# Patient Record
Sex: MALE | Race: WHITE | NOT HISPANIC OR LATINO | Employment: FULL TIME | URBAN - NONMETROPOLITAN AREA
[De-identification: names, ages, dates, MRNs, and addresses within clinical notes are randomized per-mention and may not be internally consistent; named-entity substitution may affect disease eponyms.]

---

## 2024-05-04 ENCOUNTER — OFFICE VISIT (OUTPATIENT)
Dept: URGENT CARE | Facility: CLINIC | Age: 54
End: 2024-05-04
Payer: COMMERCIAL

## 2024-05-04 VITALS
HEIGHT: 76 IN | BODY MASS INDEX: 37.26 KG/M2 | SYSTOLIC BLOOD PRESSURE: 128 MMHG | TEMPERATURE: 97 F | WEIGHT: 306 LBS | DIASTOLIC BLOOD PRESSURE: 80 MMHG | OXYGEN SATURATION: 99 % | HEART RATE: 90 BPM | RESPIRATION RATE: 16 BRPM

## 2024-05-04 DIAGNOSIS — Z23 ENCOUNTER FOR IMMUNIZATION: ICD-10-CM

## 2024-05-04 DIAGNOSIS — L03.811 CELLULITIS OF HEAD EXCEPT FACE: Primary | ICD-10-CM

## 2024-05-04 DIAGNOSIS — L55.9 SUNBURN: ICD-10-CM

## 2024-05-04 DIAGNOSIS — S80.812A ABRASION OF ANTERIOR LEFT LOWER LEG, INITIAL ENCOUNTER: ICD-10-CM

## 2024-05-04 PROCEDURE — 99203 OFFICE O/P NEW LOW 30 MIN: CPT

## 2024-05-04 PROCEDURE — 90471 IMMUNIZATION ADMIN: CPT

## 2024-05-04 PROCEDURE — 90715 TDAP VACCINE 7 YRS/> IM: CPT

## 2024-05-04 RX ORDER — TIRZEPATIDE 7.5 MG/.5ML
7.5 INJECTION, SOLUTION SUBCUTANEOUS
COMMUNITY
Start: 2024-04-06

## 2024-05-04 RX ORDER — METOPROLOL SUCCINATE 50 MG/1
75 TABLET, EXTENDED RELEASE ORAL DAILY
COMMUNITY
Start: 2024-04-11 | End: 2024-07-10

## 2024-05-04 RX ORDER — CEPHALEXIN 500 MG/1
500 CAPSULE ORAL EVERY 12 HOURS SCHEDULED
Qty: 14 CAPSULE | Refills: 0 | Status: SHIPPED | OUTPATIENT
Start: 2024-05-04 | End: 2024-05-11

## 2024-05-04 RX ORDER — ATORVASTATIN CALCIUM 20 MG/1
20 TABLET, FILM COATED ORAL DAILY
COMMUNITY
Start: 2024-03-11

## 2024-05-04 RX ORDER — HYDROCHLOROTHIAZIDE 25 MG/1
1 TABLET ORAL DAILY
COMMUNITY
Start: 2024-04-30

## 2024-05-04 RX ORDER — AMLODIPINE BESYLATE 5 MG/1
5 TABLET ORAL DAILY
COMMUNITY
Start: 2024-03-07 | End: 2025-03-07

## 2024-05-04 RX ORDER — ALPRAZOLAM 0.5 MG/1
0.5 TABLET ORAL 3 TIMES DAILY PRN
COMMUNITY
Start: 2024-02-26

## 2024-05-04 RX ORDER — PROCHLORPERAZINE 25 MG/1
1 SUPPOSITORY RECTAL
COMMUNITY
Start: 2024-04-18

## 2024-05-04 RX ORDER — LOSARTAN POTASSIUM 100 MG/1
100 TABLET ORAL DAILY
COMMUNITY
Start: 2023-11-20

## 2024-05-04 RX ORDER — ASPIRIN 81 MG/1
81 TABLET, CHEWABLE ORAL DAILY
COMMUNITY

## 2024-05-04 RX ORDER — INSULIN ASPART 100 [IU]/ML
2.7 INJECTION, SOLUTION INTRAVENOUS; SUBCUTANEOUS
COMMUNITY
Start: 2024-03-06

## 2024-05-04 NOTE — PROGRESS NOTES
[facility]        NAME: Shane Gonzalez is a 54 y.o. male  : 1970    MRN: [id number]  DATE: May 4, 2024  TIME: 2:56 PM    Assessment and Plan   Cellulitis of head except face [L03.811]  1. Cellulitis of head except face  cephalexin (KEFLEX) 500 mg capsule      2. Sunburn        3. Encounter for immunization  Tdap Vaccine greater than or equal to 6yo      4. Abrasion of anterior left lower leg, initial encounter          Left ear is red and tender with blister. Will treat with cephalexin for underlying cellulitis. Tdap updated for abrasion to left shin.     Patient Instructions       Follow up with PCP in 3-5 days.  Proceed to  ER if symptoms worsen.    Chief Complaint     Chief Complaint   Patient presents with    Earache     C/o left sided external ear pain, small abrasion noted surrounded by redness. Onset yesterday.          History of Present Illness       Patient is a 54 year old male who presents to the office today for left ear pain and swelling with redness. Notes that he was out at a track meet the other week and now his left ear is red, swollen, and tender. Denies fever or chills. Has been putting aloe on it.     Earache         Review of Systems   Review of Systems   HENT:  Positive for ear pain.    All other systems reviewed and are negative.        Current Medications       Current Outpatient Medications:     ALPRAZolam (XANAX) 0.5 mg tablet, Take 0.5 mg by mouth Three times daily as needed, Disp: , Rfl:     amLODIPine (NORVASC) 5 mg tablet, Take 5 mg by mouth daily, Disp: , Rfl:     aspirin 81 mg chewable tablet, Chew 81 mg daily, Disp: , Rfl:     atorvastatin (LIPITOR) 20 mg tablet, Take 20 mg by mouth daily, Disp: , Rfl:     cephalexin (KEFLEX) 500 mg capsule, Take 1 capsule (500 mg total) by mouth every 12 (twelve) hours for 7 days, Disp: 14 capsule, Rfl: 0    Continuous Glucose Sensor (Dexcom G6 Sensor) MISC, Inject 1 Application under the skin every 10 days, Disp: , Rfl:      "hydroCHLOROthiazide 25 mg tablet, Take 1 tablet by mouth daily, Disp: , Rfl:     Insulin Aspart (NovoLOG) 100 units/mL injection, Inject 2.7 Units under the skin every 1 (one) hour, Disp: , Rfl:     losartan (COZAAR) 100 MG tablet, Take 100 mg by mouth daily, Disp: , Rfl:     metoprolol succinate (TOPROL-XL) 50 mg 24 hr tablet, Take 75 mg by mouth daily, Disp: , Rfl:     Mounjaro 7.5 MG/0.5ML, Inject 7.5 mg under the skin every 7 days, Disp: , Rfl:     nystatin-triamcinolone (MYCOLOG-II) cream, Apply 1 application. topically 4 (four) times a day as needed, Disp: , Rfl:     Current Allergies     Allergies as of 05/04/2024    (No Known Allergies)            The following portions of the patient's history were reviewed and updated as appropriate: allergies, current medications, past family history, past medical history, past social history, past surgical history and problem list.     Past Medical History:   Diagnosis Date    Anxiety     Diabetes mellitus (HCC)     Hypertension        History reviewed. No pertinent surgical history.    History reviewed. No pertinent family history.      Medications have been verified.        Objective   /80   Pulse 90   Temp (!) 97 °F (36.1 °C)   Resp 16   Ht 6' 4\" (1.93 m)   Wt (!) 139 kg (306 lb)   SpO2 99%   BMI 37.25 kg/m²        Physical Exam     Physical Exam  Vitals and nursing note reviewed.   Constitutional:       Appearance: Normal appearance. He is normal weight.   HENT:      Ears:        Comments: Outer ear is red and swollen. Ttp.   Cardiovascular:      Rate and Rhythm: Normal rate.      Pulses: Normal pulses.   Pulmonary:      Effort: Pulmonary effort is normal.   Skin:     General: Skin is warm.      Capillary Refill: Capillary refill takes less than 2 seconds.   Neurological:      Mental Status: He is alert.                   "

## 2024-06-20 ENCOUNTER — OFFICE VISIT (OUTPATIENT)
Dept: URGENT CARE | Facility: CLINIC | Age: 54
End: 2024-06-20
Payer: COMMERCIAL

## 2024-06-20 VITALS
WEIGHT: 301 LBS | HEART RATE: 108 BPM | TEMPERATURE: 97.5 F | RESPIRATION RATE: 17 BRPM | OXYGEN SATURATION: 98 % | BODY MASS INDEX: 36.65 KG/M2 | HEIGHT: 76 IN | DIASTOLIC BLOOD PRESSURE: 62 MMHG | SYSTOLIC BLOOD PRESSURE: 106 MMHG

## 2024-06-20 DIAGNOSIS — R73.9 HYPERGLYCEMIA: ICD-10-CM

## 2024-06-20 DIAGNOSIS — L98.9 SKIN LESION OF BACK: Primary | ICD-10-CM

## 2024-06-20 PROCEDURE — S9083 URGENT CARE CENTER GLOBAL: HCPCS

## 2024-06-20 PROCEDURE — G0382 LEV 3 HOSP TYPE B ED VISIT: HCPCS

## 2024-06-20 RX ORDER — LOSARTAN POTASSIUM AND HYDROCHLOROTHIAZIDE 25; 100 MG/1; MG/1
TABLET ORAL
COMMUNITY

## 2024-06-20 RX ORDER — TADALAFIL 5 MG/1
5 TABLET ORAL DAILY
COMMUNITY
Start: 2024-06-04

## 2024-06-20 RX ORDER — SULFAMETHOXAZOLE AND TRIMETHOPRIM 800; 160 MG/1; MG/1
1 TABLET ORAL EVERY 12 HOURS SCHEDULED
Qty: 14 TABLET | Refills: 0 | Status: SHIPPED | OUTPATIENT
Start: 2024-06-20 | End: 2024-06-27

## 2024-06-20 NOTE — PROGRESS NOTES
[facility]        NAME: Shane Gonzalez is a 54 y.o. male  : 1970    MRN: [id number]  DATE: 2024  TIME: 7:13 PM    Assessment and Plan   Skin lesion of back [L98.9]  1. Skin lesion of back  sulfamethoxazole-trimethoprim (BACTRIM DS) 800-160 mg per tablet      2. Hyperglycemia          Single pustular skin lesion revealing purulent drainage with mild pressure applied. Given history of DM, will treat with Bactrim and encouraged continued supportive measures.  Patient did check blood glucose in examination room and found to be 584. Provided water as decreased PO intake and Dexcom malfunction. Advised to contact Endocrinology if malfunction remains and monitor blood glucose recordings temporarily via fingerstick. Follow up with PCP in 3-5 days or proceed to emergency department for worsening symptoms.  Red flag symptoms reviewed and low threshold to proceed to ED. Patient verbalized understanding of instructions given.        Patient Instructions     Patient Instructions   Take antibiotic as prescribed  Monitor for signs of worsening infection  Monitor blood glucose recordings  Eat and drink well  Follow up with PCP in 3-5 days.  Proceed to  ER if symptoms worsen.    If tests have been performed at [facility], our office will contact you with results if changes need to be made to the care plan discussed with you at the visit.  You can review your full results on [facility].    Diabetic Hyperglycemia   AMBULATORY CARE:   Diabetic hyperglycemia  is a blood glucose (sugar) level that is higher than your diabetes care team provider recommends. You may not have any signs and symptoms. You may have more thirst and urinate more often than usual.  Call your local emergency number (911 in the [place]) for any of the following:   You have a seizure.    You begin to breathe fast or are short of breath.    You become weak and confused.    Seek care immediately if:   Your blood sugar level is over 240  mg/dL and  you have ketones in your urine.    Your breath smells fruity.    You have nausea and are vomiting.    You have symptoms of dehydration, such as dark yellow urine, dry mouth and lips, and dry skin.    Call your diabetes care team provider if:   You continue to have higher blood sugar levels than your provider recommends.    You have questions or concerns about your condition or care.    Why it is important to manage diabetic hyperglycemia:  Over time, hyperglycemia can damage your nerves, blood vessels, tissues, and organs. Damage to arteries may increase your risk for heart attack and stroke. Nerve damage may also lead to other heart, stomach, and nerve problems. If diabetic hyperglycemia is not controlled, it can lead to diabetic ketoacidosis (DKA) or hyperglycemic hyperosmolar state (HHS). These are serious conditions that can become life-threatening.  Medicines:   Medicines , such as insulin and diabetes pills, decrease blood sugar levels.    Take your medicine as directed.  Contact your healthcare provider if you think your medicine is not helping or if you have side effects. Tell your provider if you are allergic to any medicine. Keep a list of the medicines, vitamins, and herbs you take. Include the amounts, and when and why you take them. Bring the list or the pill bottles to follow-up visits. Carry your medicine list with you in case of an emergency.    Manage diabetic hyperglycemia:   If you take diabetes medicine or insulin, take it as directed.  Missed or wrong doses can cause your blood sugar level to go up.    Tell your diabetes care team provider if you continue to have trouble managing your blood sugar level.  He or she may change the type, amount, or timing of your diabetes medicine or insulin. If you do not take diabetes medicine or insulin, you may need to start.    Work with your provider to develop a sick day plan.  Illness can cause your blood sugar to rise. A sick day plan helps you  control your blood sugar level when you are sick.    Prevent diabetic hyperglycemia:   Check your blood sugar levels regularly.  Ask your diabetes care team provider how often to check your blood sugar and what your levels should be.            Follow your meal plan.  Your blood sugar can go up if you eat a large meal or you eat more carbohydrates than recommended. Work with a dietitian to develop a meal plan that is right for you.    Exercise as directed.  Physical activity, such as exercise, can help lower your blood sugar when it is high. It can also keep your blood sugar levels steady over time. Be active for at least 30 minutes, 5 days a week. Include muscle strengthening activities 2 days each week. Do not sit for longer than 30 minutes at a time. Work with your provider to create an activity plan. Children should get at least 60 minutes of physical activity each day.         Check your ketones before exercise  if your blood sugar level is above 240 mg/dL. Do not exercise if you have ketones in your urine  because your blood sugar level may rise even more. Ask your healthcare provider how to lower your blood sugar when you have ketones.    Follow up with your diabetes care team provider as directed:  Your provider may refer you to a dietitian. Write down your questions so you remember to ask them during your visits.  © Copyright [employer] 2023 Information is for End User's use only and may not be sold, redistributed or otherwise used for commercial purposes.  The above information is an  only. It is not intended as medical advice for individual conditions or treatments. Talk to your doctor, nurse or pharmacist before following any medical regimen to see if it is safe and effective for you.  Abscess   AMBULATORY CARE:   An abscess  is an area under the skin where pus (infected fluid) collects. An abscess is often caused by bacteria, fungi or other germs that get into an open wound. You can get an  abscess anywhere on your body.       Common signs and symptoms of an abscess:  You may have a swollen mass that is red and painful. Pus may leak out of the mass. The pus will be white or yellow and may smell bad. You may have redness and pain days before the mass appears. You may have a fever and chills if the infection spreads.  Seek immediate care if:   The area around your abscess becomes very painful, warm, or has red streaks.    You have a fever and chills.    Your heart is beating faster than usual.    You feel faint or confused.    Call your doctor if:   Your abscess gets bigger or does not get better.    Your abscess returns.    You have questions or concerns about your condition or care.    Treatment for an abscess:  Your healthcare provider may need to make a cut in the abscess to allow the pus to drain. You may need surgery to remove your abscess. You may  need any of the following:  Antibiotics  help treat a bacterial infection.    Acetaminophen  decreases pain and fever. It is available without a doctor's order. Ask how much to take and how often to take it. Follow directions. Read the labels of all other medicines you are using to see if they also contain acetaminophen, or ask your doctor or pharmacist. Acetaminophen can cause liver damage if not taken correctly.    NSAIDs , such as ibuprofen, help decrease swelling, pain, and fever. This medicine is available with or without a doctor's order. NSAIDs can cause stomach bleeding or kidney problems in certain people. If you take blood thinner medicine, always ask your healthcare provider if NSAIDs are safe for you. Always read the medicine label and follow directions.    Take your medicine as directed.  Contact your healthcare provider if you think your medicine is not helping or if you have side effects. Tell your provider if you are allergic to any medicine. Keep a list of the medicines, vitamins, and herbs you take. Include the amounts, and when and  why you take them. Bring the list or the pill bottles to follow-up visits. Carry your medicine list with you in case of an emergency.    Self-care:   Apply a warm compress to your abscess.  This will help it open and drain. Wet a washcloth in warm, but not hot, water. Apply the compress for 10 minutes. Repeat this 4 times each day. Do not  press on an abscess or try to open it with a needle. You may push the bacteria deeper or into your blood.    Do not share your clothes, towels, or sheets with anyone.  This can spread the infection to others.    Wash your hands often.  This can help prevent the spread of germs. Use soap and water or an alcohol-based hand rub.       Care for your wound after it is drained:   Care for your wound as directed.  If your healthcare provider says it is okay, carefully remove the bandage and gauze packing. You may need to soak the gauze to get it out of your wound. Clean your wound and the area around it as directed. Dry the area and put on new, clean bandages. Change your bandages when they get wet or dirty.    Ask your healthcare provider how to change the gauze in your wound.  Keep track of how many pieces of gauze are placed inside the wound. Do not put too much packing in the wound. Do not pack the gauze too tightly in your wound.    Follow up with your healthcare provider in 1 to 3 days:  You may need to have your packing removed or your bandage changed. Write down your questions so you remember to ask them during your visits.  © Copyright [employer] 2023 Information is for End User's use only and may not be sold, redistributed or otherwise used for commercial purposes.  The above information is an  only. It is not intended as medical advice for individual conditions or treatments. Talk to your doctor, nurse or pharmacist before following any medical regimen to see if it is safe and effective for you.        Chief Complaint     Chief Complaint   Patient presents with     "sore     C/o sore on lower left side of back, pt is concerned because he is diabetic and his sugars have been high. Pt is also concerned about sore on left ankle.          History of Present Illness       54-year-old male with a past medical history significant for hypertension and type I DM on insulin therapy presents with multiple complaints.  Patient reports noticing skin lesion to right side of lower back today.  Denies known injury or trauma.  Denies drainage.  Denies fever, chills, or flulike symptoms.  Also reports sunburn to bilateral feet without drainage. Does see Podiatry.     Patient also reports hyperglycemia today.  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  He reports because of this schedule he was unable to eat or drink today and only had 1 bag of pretzels.  Reports Dexcom malfunction so when able to finally perform fingerstick for blood sugar he noticed it was \"high\" and over 600.  He denies feelings of hyperglycemia and normally blood glucose well-controlled with average reading of 170. Patient unsure if elevated due to skin lesion/possible cellulitis or events of the day.         Review of Systems   Review of Systems   Constitutional:  Negative for chills and fever.   HENT:  Negative for congestion, ear discharge, ear pain, rhinorrhea and sore throat.    Eyes:  Negative for discharge.   Respiratory:  Negative for cough, shortness of breath and wheezing.    Cardiovascular:  Negative for chest pain.   Gastrointestinal:  Negative for abdominal pain, diarrhea, nausea and vomiting.   Musculoskeletal:  Negative for arthralgias and myalgias.   Skin:  Positive for color change and wound.   Neurological:  Negative for weakness and numbness.         Current Medications       Current Outpatient Medications:     ALPRAZolam (XANAX) 0.5 mg tablet, Take 0.5 mg by mouth Three times daily as needed, Disp: , Rfl:     amLODIPine (NORVASC) 5 mg tablet, Take 5 mg by mouth daily, Disp: , " "Rfl:     aspirin 81 mg chewable tablet, Chew 81 mg daily, Disp: , Rfl:     atorvastatin (LIPITOR) 20 mg tablet, Take 20 mg by mouth daily, Disp: , Rfl:     Continuous Glucose Sensor (Dexcom G6 Sensor) MISC, Inject 1 Application under the skin every 10 days, Disp: , Rfl:     hydroCHLOROthiazide 25 mg tablet, Take 1 tablet by mouth daily, Disp: , Rfl:     Insulin Aspart (NovoLOG) 100 units/mL injection, Inject 2.7 Units under the skin every 1 (one) hour, Disp: , Rfl:     losartan (COZAAR) 100 MG tablet, Take 100 mg by mouth daily, Disp: , Rfl:     losartan-hydrochlorothiazide (Hyzaar) 100-25 MG per tablet, Take by mouth, Disp: , Rfl:     metoprolol succinate (TOPROL-XL) 50 mg 24 hr tablet, Take 75 mg by mouth daily, Disp: , Rfl:     Mounjaro 7.5 MG/0.5ML, Inject 7.5 mg under the skin every 7 days, Disp: , Rfl:     nystatin-triamcinolone (MYCOLOG-II) cream, Apply 1 application. topically 4 (four) times a day as needed, Disp: , Rfl:     sulfamethoxazole-trimethoprim (BACTRIM DS) 800-160 mg per tablet, Take 1 tablet by mouth every 12 (twelve) hours for 7 days, Disp: 14 tablet, Rfl: 0    tadalafil (CIALIS) 5 MG tablet, Take 5 mg by mouth daily, Disp: , Rfl:     Current Allergies     Allergies as of 06/20/2024    (No Known Allergies)            The following portions of the patient's history were reviewed and updated as appropriate: allergies, current medications, past family history, past medical history, past social history, past surgical history and problem list.     Past Medical History:   Diagnosis Date    Anxiety     Diabetes mellitus (HCC)     Hypertension        History reviewed. No pertinent surgical history.    History reviewed. No pertinent family history.      Medications have been verified.        Objective   /62   Pulse (!) 108   Temp 97.5 °F (36.4 °C)   Resp 17   Ht 6' 4\" (1.93 m)   Wt (!) 137 kg (301 lb)   SpO2 98%   BMI 36.64 kg/m²   No LMP for male patient.       Physical Exam     Physical " Exam  Vitals and nursing note reviewed.   Constitutional:       General: He is not in acute distress.     Appearance: He is not toxic-appearing.   HENT:      Head: Normocephalic.      Nose: Nose normal.      Mouth/Throat:      Mouth: Mucous membranes are dry.   Eyes:      Conjunctiva/sclera: Conjunctivae normal.   Cardiovascular:      Rate and Rhythm: Normal rate and regular rhythm.      Heart sounds: Normal heart sounds.   Pulmonary:      Effort: Pulmonary effort is normal. No respiratory distress.      Breath sounds: Normal breath sounds. No stridor. No wheezing, rhonchi or rales.   Skin:     General: Skin is warm and dry.      Findings: Lesion present.          Neurological:      Mental Status: He is alert and oriented to person, place, and time.      Gait: Gait is intact.   Psychiatric:         Mood and Affect: Mood normal.         Behavior: Behavior normal.

## 2024-06-20 NOTE — PATIENT INSTRUCTIONS
Take antibiotic as prescribed  Monitor for signs of worsening infection  Monitor blood glucose recordings  Eat and drink well  Follow up with PCP in 3-5 days.  Proceed to  ER if symptoms worsen.    If tests have been performed at [facility], our office will contact you with results if changes need to be made to the care plan discussed with you at the visit.  You can review your full results on [facility].    Diabetic Hyperglycemia   AMBULATORY CARE:   Diabetic hyperglycemia  is a blood glucose (sugar) level that is higher than your diabetes care team provider recommends. You may not have any signs and symptoms. You may have more thirst and urinate more often than usual.  Call your local emergency number (911 in the [place]) for any of the following:   You have a seizure.    You begin to breathe fast or are short of breath.    You become weak and confused.    Seek care immediately if:   Your blood sugar level is over 240 mg/dL and  you have ketones in your urine.    Your breath smells fruity.    You have nausea and are vomiting.    You have symptoms of dehydration, such as dark yellow urine, dry mouth and lips, and dry skin.    Call your diabetes care team provider if:   You continue to have higher blood sugar levels than your provider recommends.    You have questions or concerns about your condition or care.    Why it is important to manage diabetic hyperglycemia:  Over time, hyperglycemia can damage your nerves, blood vessels, tissues, and organs. Damage to arteries may increase your risk for heart attack and stroke. Nerve damage may also lead to other heart, stomach, and nerve problems. If diabetic hyperglycemia is not controlled, it can lead to diabetic ketoacidosis (DKA) or hyperglycemic hyperosmolar state (HHS). These are serious conditions that can become life-threatening.  Medicines:   Medicines , such as insulin and diabetes pills, decrease blood sugar levels.    Take your medicine as directed.  Contact  your healthcare provider if you think your medicine is not helping or if you have side effects. Tell your provider if you are allergic to any medicine. Keep a list of the medicines, vitamins, and herbs you take. Include the amounts, and when and why you take them. Bring the list or the pill bottles to follow-up visits. Carry your medicine list with you in case of an emergency.    Manage diabetic hyperglycemia:   If you take diabetes medicine or insulin, take it as directed.  Missed or wrong doses can cause your blood sugar level to go up.    Tell your diabetes care team provider if you continue to have trouble managing your blood sugar level.  He or she may change the type, amount, or timing of your diabetes medicine or insulin. If you do not take diabetes medicine or insulin, you may need to start.    Work with your provider to develop a sick day plan.  Illness can cause your blood sugar to rise. A sick day plan helps you control your blood sugar level when you are sick.    Prevent diabetic hyperglycemia:   Check your blood sugar levels regularly.  Ask your diabetes care team provider how often to check your blood sugar and what your levels should be.            Follow your meal plan.  Your blood sugar can go up if you eat a large meal or you eat more carbohydrates than recommended. Work with a dietitian to develop a meal plan that is right for you.    Exercise as directed.  Physical activity, such as exercise, can help lower your blood sugar when it is high. It can also keep your blood sugar levels steady over time. Be active for at least 30 minutes, 5 days a week. Include muscle strengthening activities 2 days each week. Do not sit for longer than 30 minutes at a time. Work with your provider to create an activity plan. Children should get at least 60 minutes of physical activity each day.         Check your ketones before exercise  if your blood sugar level is above 240 mg/dL. Do not exercise if you have ketones  in your urine  because your blood sugar level may rise even more. Ask your healthcare provider how to lower your blood sugar when you have ketones.    Follow up with your diabetes care team provider as directed:  Your provider may refer you to a dietitian. Write down your questions so you remember to ask them during your visits.  © Copyright [employer] 2023 Information is for End User's use only and may not be sold, redistributed or otherwise used for commercial purposes.  The above information is an  only. It is not intended as medical advice for individual conditions or treatments. Talk to your doctor, nurse or pharmacist before following any medical regimen to see if it is safe and effective for you.  Abscess   AMBULATORY CARE:   An abscess  is an area under the skin where pus (infected fluid) collects. An abscess is often caused by bacteria, fungi or other germs that get into an open wound. You can get an abscess anywhere on your body.       Common signs and symptoms of an abscess:  You may have a swollen mass that is red and painful. Pus may leak out of the mass. The pus will be white or yellow and may smell bad. You may have redness and pain days before the mass appears. You may have a fever and chills if the infection spreads.  Seek immediate care if:   The area around your abscess becomes very painful, warm, or has red streaks.    You have a fever and chills.    Your heart is beating faster than usual.    You feel faint or confused.    Call your doctor if:   Your abscess gets bigger or does not get better.    Your abscess returns.    You have questions or concerns about your condition or care.    Treatment for an abscess:  Your healthcare provider may need to make a cut in the abscess to allow the pus to drain. You may need surgery to remove your abscess. You may  need any of the following:  Antibiotics  help treat a bacterial infection.    Acetaminophen  decreases pain and fever. It is  available without a doctor's order. Ask how much to take and how often to take it. Follow directions. Read the labels of all other medicines you are using to see if they also contain acetaminophen, or ask your doctor or pharmacist. Acetaminophen can cause liver damage if not taken correctly.    NSAIDs , such as ibuprofen, help decrease swelling, pain, and fever. This medicine is available with or without a doctor's order. NSAIDs can cause stomach bleeding or kidney problems in certain people. If you take blood thinner medicine, always ask your healthcare provider if NSAIDs are safe for you. Always read the medicine label and follow directions.    Take your medicine as directed.  Contact your healthcare provider if you think your medicine is not helping or if you have side effects. Tell your provider if you are allergic to any medicine. Keep a list of the medicines, vitamins, and herbs you take. Include the amounts, and when and why you take them. Bring the list or the pill bottles to follow-up visits. Carry your medicine list with you in case of an emergency.    Self-care:   Apply a warm compress to your abscess.  This will help it open and drain. Wet a washcloth in warm, but not hot, water. Apply the compress for 10 minutes. Repeat this 4 times each day. Do not  press on an abscess or try to open it with a needle. You may push the bacteria deeper or into your blood.    Do not share your clothes, towels, or sheets with anyone.  This can spread the infection to others.    Wash your hands often.  This can help prevent the spread of germs. Use soap and water or an alcohol-based hand rub.       Care for your wound after it is drained:   Care for your wound as directed.  If your healthcare provider says it is okay, carefully remove the bandage and gauze packing. You may need to soak the gauze to get it out of your wound. Clean your wound and the area around it as directed. Dry the area and put on new, clean bandages.  Change your bandages when they get wet or dirty.    Ask your healthcare provider how to change the gauze in your wound.  Keep track of how many pieces of gauze are placed inside the wound. Do not put too much packing in the wound. Do not pack the gauze too tightly in your wound.    Follow up with your healthcare provider in 1 to 3 days:  You may need to have your packing removed or your bandage changed. Write down your questions so you remember to ask them during your visits.  © Copyright [employer] 2023 Information is for End User's use only and may not be sold, redistributed or otherwise used for commercial purposes.  The above information is an  only. It is not intended as medical advice for individual conditions or treatments. Talk to your doctor, nurse or pharmacist before following any medical regimen to see if it is safe and effective for you.

## 2024-08-18 ENCOUNTER — APPOINTMENT (OUTPATIENT)
Dept: RADIOLOGY | Facility: CLINIC | Age: 54
End: 2024-08-18
Payer: COMMERCIAL

## 2024-08-18 ENCOUNTER — OFFICE VISIT (OUTPATIENT)
Dept: URGENT CARE | Facility: CLINIC | Age: 54
End: 2024-08-18
Payer: COMMERCIAL

## 2024-08-18 VITALS
BODY MASS INDEX: 36.04 KG/M2 | TEMPERATURE: 96.7 F | SYSTOLIC BLOOD PRESSURE: 118 MMHG | HEIGHT: 76 IN | RESPIRATION RATE: 18 BRPM | DIASTOLIC BLOOD PRESSURE: 86 MMHG | HEART RATE: 88 BPM | OXYGEN SATURATION: 99 % | WEIGHT: 296 LBS

## 2024-08-18 DIAGNOSIS — L03.039 INFECTION OF TOENAIL: ICD-10-CM

## 2024-08-18 DIAGNOSIS — L03.116 CELLULITIS OF LEFT LOWER EXTREMITY: ICD-10-CM

## 2024-08-18 DIAGNOSIS — L03.116 CELLULITIS OF LEFT LOWER EXTREMITY: Primary | ICD-10-CM

## 2024-08-18 PROCEDURE — 87205 SMEAR GRAM STAIN: CPT

## 2024-08-18 PROCEDURE — S9083 URGENT CARE CENTER GLOBAL: HCPCS

## 2024-08-18 PROCEDURE — 87077 CULTURE AEROBIC IDENTIFY: CPT

## 2024-08-18 PROCEDURE — 87186 SC STD MICRODIL/AGAR DIL: CPT

## 2024-08-18 PROCEDURE — 87070 CULTURE OTHR SPECIMN AEROBIC: CPT

## 2024-08-18 PROCEDURE — 73620 X-RAY EXAM OF FOOT: CPT

## 2024-08-18 PROCEDURE — G0382 LEV 3 HOSP TYPE B ED VISIT: HCPCS

## 2024-08-18 RX ORDER — CEPHALEXIN 500 MG/1
500 CAPSULE ORAL EVERY 12 HOURS SCHEDULED
Qty: 14 CAPSULE | Refills: 0 | Status: SHIPPED | OUTPATIENT
Start: 2024-08-18 | End: 2024-08-25

## 2024-08-18 RX ORDER — SULFAMETHOXAZOLE/TRIMETHOPRIM 800-160 MG
1 TABLET ORAL EVERY 12 HOURS SCHEDULED
Qty: 14 TABLET | Refills: 0 | Status: SHIPPED | OUTPATIENT
Start: 2024-08-18 | End: 2024-08-25

## 2024-08-18 NOTE — PROGRESS NOTES
[facility]        NAME: Shane Gonzalez is a 54 y.o. male  : 1970    MRN: [id number]  DATE: 2024  TIME: 3:34 PM    Assessment and Plan   Cellulitis of left lower extremity [L03.116]  1. Cellulitis of left lower extremity  XR foot 2 vw left    sulfamethoxazole-trimethoprim (BACTRIM DS) 800-160 mg per tablet    cephalexin (KEFLEX) 500 mg capsule    Wound culture and Gram stain    Wound culture and Gram stain      2. Infection of toenail  Wound culture and Gram stain    Wound culture and Gram stain        Xr left foot negative for subcutaneous gas.  Will treat with bactrim and cephalexin while awaiting for wound culture.    Patient Instructions     No acute findings on xray. Will follow with xray  You have been prescribed an antibiotic.  Take antibiotic as directed for the full duration.  Do not stop the antibiotics just because you are feeling better.  Side effects of antibiotics include diarrhea.  Eat yogurt or take a probiotic or acidophilus tablet while taking this medication to help prevent diarrhea and replenish good gut bacteria.  Follow up with PCP in 3-5 days.  Proceed to  ER if symptoms worsen.    Chief Complaint     Chief Complaint   Patient presents with    Foot Pain     Multiple foot sores left foot 3rd toe (x1 week) - toenail removed; new this morning is another sore on great toe around base of nail; white/yellow and draining; redness, swelling in foot and ankle.   Diabetic and sees podiatrist - given keflex 250mg - was told by podiatrist to increase to 500mg so ran out of the keflex sooner, given script for more, however, misplaced this.          History of Present Illness       Patient is a 54 year old male who presents to the office today for worsening wounds on the left foot. He was taking 250mg of cephalexin and recently saw the podiatrist and had the third toenail removed. Has follow up tomorrow. Was told not to remove the bandage until he sees the podiatrist. Notes  that today there was purulent smelly drainage coming out from underneath the great toenail. States podiatry informed he needs a larger shoe on that foot. Has been on feet for about [id number] steps a day s/t football. Was wearing tighter shoes and the grass was slippery. Does have IDDM.         Review of Systems   Review of Systems   Skin:  Positive for wound.   All other systems reviewed and are negative.        Current Medications       Current Outpatient Medications:     ALPRAZolam (XANAX) 0.5 mg tablet, Take 0.5 mg by mouth Three times daily as needed, Disp: , Rfl:     amLODIPine (NORVASC) 5 mg tablet, Take 5 mg by mouth daily, Disp: , Rfl:     aspirin 81 mg chewable tablet, Chew 81 mg daily, Disp: , Rfl:     atorvastatin (LIPITOR) 20 mg tablet, Take 20 mg by mouth daily, Disp: , Rfl:     cephalexin (KEFLEX) 500 mg capsule, Take 1 capsule (500 mg total) by mouth every 12 (twelve) hours for 7 days, Disp: 14 capsule, Rfl: 0    Continuous Glucose Sensor (Dexcom G6 Sensor) MISC, Inject 1 Application under the skin every 10 days, Disp: , Rfl:     hydroCHLOROthiazide 25 mg tablet, Take 1 tablet by mouth daily, Disp: , Rfl:     Insulin Aspart (NovoLOG) 100 units/mL injection, Inject 2.7 Units under the skin every 1 (one) hour, Disp: , Rfl:     losartan (COZAAR) 100 MG tablet, Take 100 mg by mouth daily, Disp: , Rfl:     losartan-hydrochlorothiazide (Hyzaar) 100-25 MG per tablet, Take by mouth, Disp: , Rfl:     metoprolol succinate (TOPROL-XL) 50 mg 24 hr tablet, Take 75 mg by mouth daily, Disp: , Rfl:     Mounjaro 7.5 MG/0.5ML, Inject 7.5 mg under the skin every 7 days, Disp: , Rfl:     nystatin-triamcinolone (MYCOLOG-II) cream, Apply 1 application. topically 4 (four) times a day as needed, Disp: , Rfl:     sulfamethoxazole-trimethoprim (BACTRIM DS) 800-160 mg per tablet, Take 1 tablet by mouth every 12 (twelve) hours for 7 days, Disp: 14 tablet, Rfl: 0    tadalafil (CIALIS) 5 MG tablet, Take 5 mg by mouth daily, Disp: ,  "Rfl:     Current Allergies     Allergies as of 08/18/2024    (No Known Allergies)            The following portions of the patient's history were reviewed and updated as appropriate: allergies, current medications, past family history, past medical history, past social history, past surgical history and problem list.     Past Medical History:   Diagnosis Date    Anxiety     Diabetes mellitus (HCC)     Hypertension        History reviewed. No pertinent surgical history.    History reviewed. No pertinent family history.      Medications have been verified.        Objective   /86   Pulse 88   Temp (!) 96.7 °F (35.9 °C)   Resp 18   Ht 6' 4\" (1.93 m)   Wt 134 kg (296 lb)   SpO2 99%   BMI 36.03 kg/m²        Physical Exam     Physical Exam  Vitals and nursing note reviewed.   Constitutional:       General: He is not in acute distress.     Appearance: He is not ill-appearing or toxic-appearing.   Cardiovascular:      Rate and Rhythm: Normal rate and regular rhythm.      Pulses: Normal pulses.      Heart sounds: Normal heart sounds.   Pulmonary:      Effort: Pulmonary effort is normal.      Breath sounds: Normal breath sounds.   Musculoskeletal:      Left foot: Normal range of motion and normal capillary refill. Swelling and tenderness present. No deformity, bunion, Charcot foot, foot drop, prominent metatarsal heads, laceration, bony tenderness or crepitus. Normal pulse.        Feet:    Skin:     General: Skin is warm.      Capillary Refill: Capillary refill takes less than 2 seconds.   Neurological:      Mental Status: He is alert.                   "

## 2024-08-18 NOTE — PATIENT INSTRUCTIONS
No acute findings on xray. Will follow with xray  You have been prescribed an antibiotic.  Take antibiotic as directed for the full duration.  Do not stop the antibiotics just because you are feeling better.  Side effects of antibiotics include diarrhea.  Eat yogurt or take a probiotic or acidophilus tablet while taking this medication to help prevent diarrhea and replenish good gut bacteria.

## 2024-08-19 ENCOUNTER — TELEPHONE (OUTPATIENT)
Dept: URGENT CARE | Facility: CLINIC | Age: 54
End: 2024-08-19

## 2024-08-19 NOTE — TELEPHONE ENCOUNTER
Tried contacting patient about wound culture results. The voicemail was full on home number and went right to voicemail that was also full for mobile number.  Preliminary shows gram-negative rods.  Was going to advise patient to continue antibiotics as prescribed.  Pending final results.

## 2024-08-21 ENCOUNTER — TELEPHONE (OUTPATIENT)
Dept: URGENT CARE | Facility: CLINIC | Age: 54
End: 2024-08-21

## 2024-08-21 DIAGNOSIS — A49.8 KLEBSIELLA INFECTION: ICD-10-CM

## 2024-08-21 DIAGNOSIS — A49.8 PSEUDOMONAS INFECTION: ICD-10-CM

## 2024-08-21 DIAGNOSIS — A49.8 PSEUDOMONAS AERUGINOSA INFECTION: Primary | ICD-10-CM

## 2024-08-21 LAB
BACTERIA WND AEROBE CULT: ABNORMAL
GRAM STN SPEC: ABNORMAL

## 2024-08-21 RX ORDER — LEVOFLOXACIN 750 MG/1
750 TABLET, FILM COATED ORAL EVERY 24 HOURS
Qty: 5 TABLET | Refills: 0 | Status: SHIPPED | OUTPATIENT
Start: 2024-08-21 | End: 2024-08-26

## 2024-08-21 NOTE — TELEPHONE ENCOUNTER
Discussed case with pharm ID who had added sensitivities at micro. Sensitivity to levofloxacin to all 3 bacteria. Will treat with levaquin 750mg po Q24 hours x 5 days. Will attempt to get in touch with him.

## 2024-10-26 ENCOUNTER — OFFICE VISIT (OUTPATIENT)
Dept: URGENT CARE | Facility: CLINIC | Age: 54
End: 2024-10-26
Payer: COMMERCIAL

## 2024-10-26 ENCOUNTER — APPOINTMENT (OUTPATIENT)
Dept: RADIOLOGY | Facility: CLINIC | Age: 54
End: 2024-10-26
Payer: COMMERCIAL

## 2024-10-26 VITALS
SYSTOLIC BLOOD PRESSURE: 148 MMHG | BODY MASS INDEX: 36.8 KG/M2 | DIASTOLIC BLOOD PRESSURE: 84 MMHG | HEIGHT: 75 IN | TEMPERATURE: 96.2 F | OXYGEN SATURATION: 99 % | WEIGHT: 296 LBS | HEART RATE: 96 BPM | RESPIRATION RATE: 18 BRPM

## 2024-10-26 DIAGNOSIS — J18.9 PNEUMONIA OF LEFT LUNG DUE TO INFECTIOUS ORGANISM, UNSPECIFIED PART OF LUNG: Primary | ICD-10-CM

## 2024-10-26 DIAGNOSIS — R05.1 ACUTE COUGH: ICD-10-CM

## 2024-10-26 DIAGNOSIS — H10.31 ACUTE BACTERIAL CONJUNCTIVITIS OF RIGHT EYE: ICD-10-CM

## 2024-10-26 PROCEDURE — 71046 X-RAY EXAM CHEST 2 VIEWS: CPT

## 2024-10-26 PROCEDURE — G0382 LEV 3 HOSP TYPE B ED VISIT: HCPCS

## 2024-10-26 PROCEDURE — S9083 URGENT CARE CENTER GLOBAL: HCPCS

## 2024-10-26 RX ORDER — TOBRAMYCIN AND DEXAMETHASONE 3; 1 MG/ML; MG/ML
1 SUSPENSION/ DROPS OPHTHALMIC
Qty: 1.75 ML | Refills: 0 | Status: SHIPPED | OUTPATIENT
Start: 2024-10-26 | End: 2024-11-02

## 2024-10-26 NOTE — PATIENT INSTRUCTIONS
Take antibiotic as prescribed  Use eye drops as prescribed  Use OTC Coricidin   Plenty of fluids  Can use honey   Cool mist humidifier   Warm gargle with salt water for sore throat   Use Tylenol/ibuprofen as needed for fever or pain    Follow up with PCP in 3-5 days.  Proceed to  ER if symptoms worsen.    If tests are performed, our office will contact you with results only if changes need to made to the care plan discussed with you at the visit. You can review your full results on [facility].

## 2024-10-26 NOTE — PROGRESS NOTES
[facility]        NAME: Shane Gonzalez is a 54 y.o. male  : 1970    MRN: [id number]  DATE: 2024  TIME: 10:26 AM    Assessment and Plan   Pneumonia of left lung due to infectious organism, unspecified part of lung [J18.9]  1. Pneumonia of left lung due to infectious organism, unspecified part of lung  amoxicillin-clavulanate (AUGMENTIN) 875-125 mg per tablet      2. Acute cough  XR chest pa and lateral      3. Acute bacterial conjunctivitis of right eye  tobramycin-dexamethasone (TOBRADEX) ophthalmic suspension        Preliminary xray read by myself. Suspicious for PNA in L lobe. Pending radiologist final read.      Patient Instructions     Take antibiotic as prescribed  Use eye drops as prescribed  Use OTC Coricidin   Plenty of fluids  Can use honey   Cool mist humidifier   Warm gargle with salt water for sore throat   Use Tylenol/ibuprofen as needed for fever or pain    Follow up with PCP in 3-5 days.  Proceed to  ER if symptoms worsen.    If tests are performed, our office will contact you with results only if changes need to made to the care plan discussed with you at the visit. You can review your full results on [facility].    Chief Complaint     Chief Complaint   Patient presents with    Cold Like Symptoms     Congestion, right ear pain, eye irritation, harsh cough; chills, fatigue, body aches; symptoms started about 5 days ago and getting worse.   Motrin at home with some relief          History of Present Illness       Cough  This is a new problem. Episode onset: 5 days. The problem has been gradually worsening. The cough is Productive of sputum. Associated symptoms include chills, ear pain (R ear), headaches, myalgias and a sore throat. Pertinent negatives include no chest pain, eye redness, fever, postnasal drip, rhinorrhea, shortness of breath or wheezing. Treatments tried: motrin. The treatment provided mild relief.       Review of Systems   Review of Systems    Constitutional:  Positive for chills and fatigue. Negative for fever.   HENT:  Positive for congestion, ear pain (R ear) and sore throat. Negative for postnasal drip, rhinorrhea, sinus pressure, sneezing and tinnitus.    Eyes:  Positive for pain and discharge (crusting and mucus of R eye). Negative for photophobia, redness and itching.   Respiratory:  Positive for cough and chest tightness. Negative for shortness of breath and wheezing.    Cardiovascular:  Negative for chest pain.   Musculoskeletal:  Positive for myalgias.   Skin:  Negative for color change and pallor.   Neurological:  Positive for headaches. Negative for dizziness and light-headedness.   Psychiatric/Behavioral:  Negative for confusion.          Current Medications       Current Outpatient Medications:     ALPRAZolam (XANAX) 0.5 mg tablet, Take 0.5 mg by mouth Three times daily as needed, Disp: , Rfl:     amLODIPine (NORVASC) 5 mg tablet, Take 5 mg by mouth daily, Disp: , Rfl:     amoxicillin-clavulanate (AUGMENTIN) 875-125 mg per tablet, Take 1 tablet by mouth every 12 (twelve) hours for 7 days, Disp: 14 tablet, Rfl: 0    aspirin 81 mg chewable tablet, Chew 81 mg daily, Disp: , Rfl:     atorvastatin (LIPITOR) 20 mg tablet, Take 20 mg by mouth daily, Disp: , Rfl:     Continuous Glucose Sensor (Dexcom G6 Sensor) MISC, Inject 1 Application under the skin every 10 days, Disp: , Rfl:     hydroCHLOROthiazide 25 mg tablet, Take 1 tablet by mouth daily, Disp: , Rfl:     Insulin Aspart (NovoLOG) 100 units/mL injection, Inject 2.7 Units under the skin every 1 (one) hour, Disp: , Rfl:     losartan (COZAAR) 100 MG tablet, Take 100 mg by mouth daily, Disp: , Rfl:     losartan-hydrochlorothiazide (Hyzaar) 100-25 MG per tablet, Take by mouth, Disp: , Rfl:     metoprolol succinate (TOPROL-XL) 50 mg 24 hr tablet, Take 75 mg by mouth daily, Disp: , Rfl:     Mounjaro 7.5 MG/0.5ML, Inject 7.5 mg under the skin every 7 days, Disp: , Rfl:     nystatin-triamcinolone  "(MYCOLOG-II) cream, Apply 1 application. topically 4 (four) times a day as needed, Disp: , Rfl:     tadalafil (CIALIS) 5 MG tablet, Take 5 mg by mouth daily, Disp: , Rfl:     tobramycin-dexamethasone (TOBRADEX) ophthalmic suspension, Administer 1 drop to the right eye every 4 (four) hours while awake for 7 days, Disp: 1.75 mL, Rfl: 0    Current Allergies     Allergies as of 10/26/2024    (No Known Allergies)            The following portions of the patient's history were reviewed and updated as appropriate: allergies, current medications, past family history, past medical history, past social history, past surgical history and problem list.     Past Medical History:   Diagnosis Date    Anxiety     Diabetes mellitus (HCC)     Hypertension        History reviewed. No pertinent surgical history.    History reviewed. No pertinent family history.      Medications have been verified.        Objective   /84   Pulse 96   Temp (!) 96.2 °F (35.7 °C)   Resp 18   Ht 6' 3\" (1.905 m)   Wt 134 kg (296 lb)   SpO2 99%   BMI 37.00 kg/m²        Physical Exam     Physical Exam  Constitutional:       Appearance: Normal appearance.   HENT:      Head: Normocephalic.      Right Ear: External ear normal. Tympanic membrane is injected. Tympanic membrane is not bulging.      Left Ear: Tympanic membrane and external ear normal. Tympanic membrane is not injected, erythematous or bulging.      Nose: Congestion present.      Mouth/Throat:      Mouth: Mucous membranes are moist.      Pharynx: Oropharynx is clear.   Eyes:      General: Lids are normal.         Right eye: Discharge present.         Left eye: No discharge.      Extraocular Movements: Extraocular movements intact.      Conjunctiva/sclera:      Right eye: Right conjunctiva is injected.      Pupils: Pupils are equal, round, and reactive to light.   Cardiovascular:      Rate and Rhythm: Normal rate and regular rhythm.      Pulses: Normal pulses.      Heart sounds: Normal " heart sounds.   Pulmonary:      Effort: Pulmonary effort is normal. No respiratory distress.      Breath sounds: Normal breath sounds. No stridor. No wheezing, rhonchi or rales.   Musculoskeletal:      Cervical back: No tenderness.   Lymphadenopathy:      Cervical: No cervical adenopathy.   Skin:     General: Skin is warm and dry.   Neurological:      General: No focal deficit present.      Mental Status: He is alert and oriented to person, place, and time. Mental status is at baseline.   Psychiatric:         Mood and Affect: Mood normal.         Behavior: Behavior normal.         Thought Content: Thought content normal.         Judgment: Judgment normal.